# Patient Record
(demographics unavailable — no encounter records)

---

## 2024-10-18 NOTE — DISCUSSION/SUMMARY
[FreeTextEntry1] : get ETT  2 d echo  bloodwork hscrp  send to endo Dr. Allen has not has thyroid checked  f/u with Penny in a month   [EKG obtained to assist in diagnosis and management of assessed problem(s)] : EKG obtained to assist in diagnosis and management of assessed problem(s)

## 2024-10-18 NOTE — PHYSICAL EXAM
[Normal Venous Pressure] : normal venous pressure [Normal S1, S2] : normal S1, S2 [Clear Lung Fields] : clear lung fields [Soft] : abdomen soft [No Edema] : no edema [de-identified] : rrr

## 2024-10-18 NOTE — PHYSICAL EXAM
[Normal Venous Pressure] : normal venous pressure [Normal S1, S2] : normal S1, S2 [Clear Lung Fields] : clear lung fields [Soft] : abdomen soft [No Edema] : no edema [de-identified] : rrr

## 2024-10-18 NOTE — HISTORY OF PRESENT ILLNESS
[FreeTextEntry1] : Pt with hypothyroid, Factor V defiecny, h/o preeclampsia.   pt says last week started having chest pressure progressively worsening with sob at rest lasting a couple of minutes, no diaphoresis on n/v.  pt has not exercised in over a year. pt says cleaned her house last week and had chest pressure.  pt denies palpitations, pt has some tachycardia with moving at times but not very common.  pt denies syncope, pt has had no clots.